# Patient Record
Sex: FEMALE | Race: WHITE | NOT HISPANIC OR LATINO
[De-identification: names, ages, dates, MRNs, and addresses within clinical notes are randomized per-mention and may not be internally consistent; named-entity substitution may affect disease eponyms.]

---

## 2024-09-16 ENCOUNTER — DASHBOARD ENCOUNTERS (OUTPATIENT)
Age: 64
End: 2024-09-16

## 2024-09-16 ENCOUNTER — LAB OUTSIDE AN ENCOUNTER (OUTPATIENT)
Dept: URBAN - NONMETROPOLITAN AREA CLINIC 2 | Facility: CLINIC | Age: 64
End: 2024-09-16

## 2024-09-16 ENCOUNTER — OFFICE VISIT (OUTPATIENT)
Dept: URBAN - NONMETROPOLITAN AREA CLINIC 2 | Facility: CLINIC | Age: 64
End: 2024-09-16

## 2024-09-16 ENCOUNTER — TELEPHONE ENCOUNTER (OUTPATIENT)
Dept: URBAN - NONMETROPOLITAN AREA CLINIC 2 | Facility: CLINIC | Age: 64
End: 2024-09-16

## 2024-09-16 VITALS
DIASTOLIC BLOOD PRESSURE: 83 MMHG | BODY MASS INDEX: 25.23 KG/M2 | HEART RATE: 63 BPM | HEIGHT: 66 IN | WEIGHT: 157 LBS | SYSTOLIC BLOOD PRESSURE: 147 MMHG | TEMPERATURE: 98 F

## 2024-09-16 PROBLEM — 79922009: Status: ACTIVE | Noted: 2024-09-16

## 2024-09-16 PROBLEM — 398032003: Status: ACTIVE | Noted: 2024-09-16

## 2024-09-16 PROBLEM — 247330004: Status: ACTIVE | Noted: 2024-09-16

## 2024-09-16 PROBLEM — 70342003: Status: ACTIVE | Noted: 2024-09-16

## 2024-09-16 RX ORDER — PANTOPRAZOLE SODIUM 20 MG/1
TABLET, DELAYED RELEASE ORAL
Qty: 90 TABLET | Status: ACTIVE | COMMUNITY

## 2024-09-16 RX ORDER — VALACYCLOVIR 500 MG/1
TABLET, FILM COATED ORAL
Qty: 90 TABLET | Status: ACTIVE | COMMUNITY

## 2024-09-16 RX ORDER — ATORVASTATIN CALCIUM 20 MG/1
TABLET, FILM COATED ORAL
Qty: 90 TABLET | Status: ACTIVE | COMMUNITY

## 2024-09-16 NOTE — HPI-TODAY'S VISIT:
9/16/2024   [place] 2017 due in 10 yrs  GI issues 3 weeks d/c alfonso one emesis early this month start pantoprazole 20 mg cramping lower abd CT scan Friday  some advil  1-2 a week cut back on EtOH 1-2 wine a week  2 cups coffee every morning

## 2024-09-17 LAB
ABSOLUTE BASOPHILS: 60
ABSOLUTE EOSINOPHILS: 498
ABSOLUTE LYMPHOCYTES: 1488
ABSOLUTE MONOCYTES: 348
ABSOLUTE NEUTROPHILS: 3606
ALBUMIN/GLOBULIN RATIO: 1.7
ALBUMIN: 4.9
ALKALINE PHOSPHATASE: 90
ALT (SGPT): 21
AST (SGOT): 20
BASOPHILS: 1
BILIRUBIN, DIRECT: 0.1
BILIRUBIN, INDIRECT: 0.5
BILIRUBIN, TOTAL: 0.6
BUN/CREATININE RATIO: (no result)
CALCIUM: 10.1
CARBON DIOXIDE: 27
CHLORIDE: 101
CREATININE: 0.95
EGFR: 66
EOSINOPHILS: 8.3
GLOBULIN: 2.9
GLUCOSE: 76
HEMATOCRIT: 44.3
HEMOGLOBIN: 14.6
LYMPHOCYTES: 24.8
MCH: 29.7
MCHC: 33
MCV: 90.2
MONOCYTES: 5.8
MPV: 10.7
NEUTROPHILS: 60.1
PLATELET COUNT: 242
POTASSIUM: 4.1
PROTEIN, TOTAL: 7.8
RDW: 13
RED BLOOD CELL COUNT: 4.91
SODIUM: 139
UREA NITROGEN (BUN): 12
WHITE BLOOD CELL COUNT: 6

## 2024-09-27 ENCOUNTER — TELEPHONE ENCOUNTER (OUTPATIENT)
Dept: URBAN - NONMETROPOLITAN AREA CLINIC 2 | Facility: CLINIC | Age: 64
End: 2024-09-27

## 2024-10-01 ENCOUNTER — LAB OUTSIDE AN ENCOUNTER (OUTPATIENT)
Dept: URBAN - NONMETROPOLITAN AREA CLINIC 2 | Facility: CLINIC | Age: 64
End: 2024-10-01

## 2024-10-02 ENCOUNTER — TELEPHONE ENCOUNTER (OUTPATIENT)
Dept: URBAN - NONMETROPOLITAN AREA CLINIC 2 | Facility: CLINIC | Age: 64
End: 2024-10-02

## 2024-10-04 ENCOUNTER — TELEPHONE ENCOUNTER (OUTPATIENT)
Dept: URBAN - NONMETROPOLITAN AREA CLINIC 2 | Facility: CLINIC | Age: 64
End: 2024-10-04

## 2024-10-07 ENCOUNTER — TELEPHONE ENCOUNTER (OUTPATIENT)
Dept: URBAN - NONMETROPOLITAN AREA CLINIC 2 | Facility: CLINIC | Age: 64
End: 2024-10-07

## 2024-10-11 ENCOUNTER — OFFICE VISIT (OUTPATIENT)
Dept: URBAN - NONMETROPOLITAN AREA SURGERY CENTER 1 | Facility: SURGERY CENTER | Age: 64
End: 2024-10-11

## 2024-10-18 ENCOUNTER — OFFICE VISIT (OUTPATIENT)
Dept: URBAN - NONMETROPOLITAN AREA SURGERY CENTER 1 | Facility: SURGERY CENTER | Age: 64
End: 2024-10-18

## 2024-10-25 ENCOUNTER — CLAIMS CREATED FROM THE CLAIM WINDOW (OUTPATIENT)
Dept: URBAN - NONMETROPOLITAN AREA SURGERY CENTER 1 | Facility: SURGERY CENTER | Age: 64
End: 2024-10-25
Payer: MEDICARE

## 2024-10-25 ENCOUNTER — OFFICE VISIT (OUTPATIENT)
Dept: URBAN - NONMETROPOLITAN AREA SURGERY CENTER 1 | Facility: SURGERY CENTER | Age: 64
End: 2024-10-25

## 2024-10-25 DIAGNOSIS — K31.89 OTHER DISEASES OF STOMACH AND DUODENUM: ICD-10-CM

## 2024-10-25 DIAGNOSIS — K63.89 OTHER SPECIFIED DISEASES OF INTESTINE: ICD-10-CM

## 2024-10-25 PROCEDURE — 00813 ANES UPR LWR GI NDSC PX: CPT | Performed by: NURSE ANESTHETIST, CERTIFIED REGISTERED

## 2024-11-19 ENCOUNTER — LAB OUTSIDE AN ENCOUNTER (OUTPATIENT)
Dept: URBAN - NONMETROPOLITAN AREA CLINIC 2 | Facility: CLINIC | Age: 64
End: 2024-11-19

## 2024-11-19 ENCOUNTER — OFFICE VISIT (OUTPATIENT)
Dept: URBAN - NONMETROPOLITAN AREA CLINIC 2 | Facility: CLINIC | Age: 64
End: 2024-11-19
Payer: MEDICARE

## 2024-11-19 VITALS
TEMPERATURE: 98 F | WEIGHT: 155 LBS | SYSTOLIC BLOOD PRESSURE: 151 MMHG | BODY MASS INDEX: 24.91 KG/M2 | HEIGHT: 66 IN | HEART RATE: 54 BPM | DIASTOLIC BLOOD PRESSURE: 101 MMHG

## 2024-11-19 DIAGNOSIS — R10.13 EPIGASTRIC PAIN: ICD-10-CM

## 2024-11-19 DIAGNOSIS — Z80.0 FAMILY HISTORY OF ESOPHAGEAL CANCER: ICD-10-CM

## 2024-11-19 DIAGNOSIS — R10.32 ABDOMINAL PAIN, LLQ: ICD-10-CM

## 2024-11-19 DIAGNOSIS — R19.5 LOOSE STOOLS: ICD-10-CM

## 2024-11-19 DIAGNOSIS — K80.20 CALCULUS OF GALLBLADDER WITHOUT CHOLECYSTITIS WITHOUT OBSTRUCTION: ICD-10-CM

## 2024-11-19 DIAGNOSIS — R11.0 NAUSEA: ICD-10-CM

## 2024-11-19 PROBLEM — 430331003: Status: ACTIVE | Noted: 2024-11-19

## 2024-11-19 PROBLEM — 422587007: Status: ACTIVE | Noted: 2024-11-19

## 2024-11-19 PROCEDURE — 99213 OFFICE O/P EST LOW 20 MIN: CPT

## 2024-11-19 RX ORDER — PANTOPRAZOLE SODIUM 20 MG/1
TABLET, DELAYED RELEASE ORAL
Qty: 90 TABLET | Status: ACTIVE | COMMUNITY

## 2024-11-19 RX ORDER — ATORVASTATIN CALCIUM 20 MG/1
TABLET, FILM COATED ORAL
Qty: 90 TABLET | Status: ACTIVE | COMMUNITY

## 2024-11-19 RX ORDER — VALACYCLOVIR 500 MG/1
TABLET, FILM COATED ORAL
Qty: 90 TABLET | Status: ACTIVE | COMMUNITY

## 2024-11-19 NOTE — HPI-TODAY'S VISIT:
9/16/2024  Patient presents to schedule colonoscopy. Previous was done in  [place] 2017 , stated to repeat in 10 yrs However she has had new occurence of  GI issues including nausea with emesis, earlier this month started on pa pantoprazole 20 mg. Feels occassional  cramping in her  lower abdomen. Does endorse NSAID use, with some advil  1-2 a week. Since onset of symptoms has  cut back on EtOH, now with 1-2 wine a week  2 cups coffee every morning. Patient denies  dysphagia, abdominal pain, melena, rectal bleeding, weight loss, fever.  Discussed workup with endoscopic evaluation and she is agreeable.  SP  11/19/2024 Ginger returns to clinic following her EGD and colonoscopy completed with Dr. Bassett.  Since her last office visit she was determined to have gallstones and a borderline HIDA scan.  Therefore an upper endoscopy was added to her schedule colonoscopy.  This revealed only mild chronic gastritis with other normal findings.  Her biopsies of stomach, duodenum, TI, colon were all with no significant abnormality.  She is not found to have any colon polyps and is due to repeat her colonoscopy in 10 years. Today she continues with occasions of left lower quadrant pain, episodes of nausea.  She denies any epigastric pain heartburn or reflux. Today we discussed with her history of gallstones and current symptoms with borderline HIDA she is recommended to discuss cholecystectomy with a general surgeon. She does have history of females in her family who have their gallbladders removed.  She herself is cautious with a concern that she may make the wrong decision.  However she feels time constraints given her upcoming travel to [place] and [place] [facility] starting in January. She will discuss this with Dr. Brown and general surgeon following her follow-up today. She reports normal daily bowel movements without constipation or diarrhea. For her left lower quadrant pain we discussed starting a bowel regimen of fiber and MiraLAX to improve her stool burden of her sigmoid colon.  For her gastritis we discussed avoidance of NSAIDs and discontinuation of alcohol. She will return to clinic 10 years for her repeat colonoscopy, sooner if needed. MATT